# Patient Record
Sex: FEMALE | Race: WHITE | NOT HISPANIC OR LATINO | Employment: FULL TIME | URBAN - METROPOLITAN AREA
[De-identification: names, ages, dates, MRNs, and addresses within clinical notes are randomized per-mention and may not be internally consistent; named-entity substitution may affect disease eponyms.]

---

## 2023-07-07 ENCOUNTER — APPOINTMENT (OUTPATIENT)
Dept: RADIOLOGY | Facility: MEDICAL CENTER | Age: 33
End: 2023-07-07
Attending: SURGERY
Payer: COMMERCIAL

## 2023-07-07 ENCOUNTER — HOSPITAL ENCOUNTER (EMERGENCY)
Facility: MEDICAL CENTER | Age: 33
End: 2023-07-08
Attending: STUDENT IN AN ORGANIZED HEALTH CARE EDUCATION/TRAINING PROGRAM
Payer: COMMERCIAL

## 2023-07-07 ENCOUNTER — APPOINTMENT (OUTPATIENT)
Dept: RADIOLOGY | Facility: MEDICAL CENTER | Age: 33
End: 2023-07-07
Attending: STUDENT IN AN ORGANIZED HEALTH CARE EDUCATION/TRAINING PROGRAM
Payer: COMMERCIAL

## 2023-07-07 DIAGNOSIS — S02.401A CLOSED FRACTURE OF MAXILLA, UNSPECIFIED LATERALITY, INITIAL ENCOUNTER (HCC): ICD-10-CM

## 2023-07-07 DIAGNOSIS — S09.90XA CLOSED HEAD INJURY, INITIAL ENCOUNTER: ICD-10-CM

## 2023-07-07 DIAGNOSIS — S01.511A LIP LACERATION, INITIAL ENCOUNTER: ICD-10-CM

## 2023-07-07 DIAGNOSIS — S02.5XXA CLOSED FRACTURE OF TOOTH, INITIAL ENCOUNTER: ICD-10-CM

## 2023-07-07 LAB
ABO + RH BLD: NORMAL
ABO GROUP BLD: NORMAL
ALBUMIN SERPL BCP-MCNC: 4.3 G/DL (ref 3.2–4.9)
ALBUMIN/GLOB SERPL: 1.9 G/DL
ALP SERPL-CCNC: 34 U/L (ref 30–99)
ALT SERPL-CCNC: 9 U/L (ref 2–50)
ANION GAP SERPL CALC-SCNC: 13 MMOL/L (ref 7–16)
APTT PPP: 24.1 SEC (ref 24.7–36)
AST SERPL-CCNC: 14 U/L (ref 12–45)
BILIRUB SERPL-MCNC: 0.3 MG/DL (ref 0.1–1.5)
BLD GP AB SCN SERPL QL: NORMAL
BUN SERPL-MCNC: 10 MG/DL (ref 8–22)
CALCIUM ALBUM COR SERPL-MCNC: 8.5 MG/DL (ref 8.5–10.5)
CALCIUM SERPL-MCNC: 8.7 MG/DL (ref 8.5–10.5)
CHLORIDE SERPL-SCNC: 102 MMOL/L (ref 96–112)
CO2 SERPL-SCNC: 19 MMOL/L (ref 20–33)
CREAT SERPL-MCNC: 0.71 MG/DL (ref 0.5–1.4)
ERYTHROCYTE [DISTWIDTH] IN BLOOD BY AUTOMATED COUNT: 38.9 FL (ref 35.9–50)
ETHANOL BLD-MCNC: 65 MG/DL
GFR SERPLBLD CREATININE-BSD FMLA CKD-EPI: 115 ML/MIN/1.73 M 2
GLOBULIN SER CALC-MCNC: 2.3 G/DL (ref 1.9–3.5)
GLUCOSE SERPL-MCNC: 134 MG/DL (ref 65–99)
HCG SERPL QL: NEGATIVE
HCT VFR BLD AUTO: 36.9 % (ref 37–47)
HGB BLD-MCNC: 12.5 G/DL (ref 12–16)
INR PPP: 1.14 (ref 0.87–1.13)
MCH RBC QN AUTO: 30.9 PG (ref 27–33)
MCHC RBC AUTO-ENTMCNC: 33.9 G/DL (ref 32.2–35.5)
MCV RBC AUTO: 91.3 FL (ref 81.4–97.8)
PLATELET # BLD AUTO: 289 K/UL (ref 164–446)
PMV BLD AUTO: 9.9 FL (ref 9–12.9)
POTASSIUM SERPL-SCNC: 3.5 MMOL/L (ref 3.6–5.5)
PROT SERPL-MCNC: 6.6 G/DL (ref 6–8.2)
PROTHROMBIN TIME: 14.4 SEC (ref 12–14.6)
RBC # BLD AUTO: 4.04 M/UL (ref 4.2–5.4)
RH BLD: NORMAL
SODIUM SERPL-SCNC: 134 MMOL/L (ref 135–145)
WBC # BLD AUTO: 9.3 K/UL (ref 4.8–10.8)

## 2023-07-07 PROCEDURE — 700101 HCHG RX REV CODE 250: Performed by: STUDENT IN AN ORGANIZED HEALTH CARE EDUCATION/TRAINING PROGRAM

## 2023-07-07 PROCEDURE — 72125 CT NECK SPINE W/O DYE: CPT

## 2023-07-07 PROCEDURE — 86900 BLOOD TYPING SEROLOGIC ABO: CPT

## 2023-07-07 PROCEDURE — 306637 HCHG MISC ORTHO ITEM RC 0274

## 2023-07-07 PROCEDURE — 304999 HCHG REPAIR-SIMPLE/INTERMED LEVEL 1

## 2023-07-07 PROCEDURE — 72131 CT LUMBAR SPINE W/O DYE: CPT

## 2023-07-07 PROCEDURE — 71045 X-RAY EXAM CHEST 1 VIEW: CPT

## 2023-07-07 PROCEDURE — 84703 CHORIONIC GONADOTROPIN ASSAY: CPT

## 2023-07-07 PROCEDURE — 70450 CT HEAD/BRAIN W/O DYE: CPT

## 2023-07-07 PROCEDURE — 36415 COLL VENOUS BLD VENIPUNCTURE: CPT

## 2023-07-07 PROCEDURE — 71260 CT THORAX DX C+: CPT

## 2023-07-07 PROCEDURE — 85027 COMPLETE CBC AUTOMATED: CPT

## 2023-07-07 PROCEDURE — 85610 PROTHROMBIN TIME: CPT

## 2023-07-07 PROCEDURE — 82077 ASSAY SPEC XCP UR&BREATH IA: CPT

## 2023-07-07 PROCEDURE — 700117 HCHG RX CONTRAST REV CODE 255: Performed by: STUDENT IN AN ORGANIZED HEALTH CARE EDUCATION/TRAINING PROGRAM

## 2023-07-07 PROCEDURE — 85730 THROMBOPLASTIN TIME PARTIAL: CPT

## 2023-07-07 PROCEDURE — 86901 BLOOD TYPING SEROLOGIC RH(D): CPT

## 2023-07-07 PROCEDURE — 70486 CT MAXILLOFACIAL W/O DYE: CPT

## 2023-07-07 PROCEDURE — 99285 EMERGENCY DEPT VISIT HI MDM: CPT

## 2023-07-07 PROCEDURE — 305949 HCHG RED TRAUMA ACT PRE-NOTIFY NO CC

## 2023-07-07 PROCEDURE — 80053 COMPREHEN METABOLIC PANEL: CPT

## 2023-07-07 PROCEDURE — 86850 RBC ANTIBODY SCREEN: CPT

## 2023-07-07 PROCEDURE — 303353 HCHG DERMABOND SKIN ADHESIVE

## 2023-07-07 PROCEDURE — 303747 HCHG EXTRA SUTURE

## 2023-07-07 PROCEDURE — 72128 CT CHEST SPINE W/O DYE: CPT

## 2023-07-07 PROCEDURE — 72170 X-RAY EXAM OF PELVIS: CPT

## 2023-07-07 RX ORDER — ACETAMINOPHEN 500 MG
500-1000 TABLET ORAL EVERY 6 HOURS PRN
Qty: 30 TABLET | Refills: 0 | Status: SHIPPED | OUTPATIENT
Start: 2023-07-07

## 2023-07-07 RX ORDER — IBUPROFEN 600 MG/1
600 TABLET ORAL EVERY 6 HOURS PRN
Qty: 30 TABLET | Refills: 0 | Status: SHIPPED | OUTPATIENT
Start: 2023-07-07

## 2023-07-07 RX ORDER — CLINDAMYCIN HYDROCHLORIDE 300 MG/1
300 CAPSULE ORAL 3 TIMES DAILY
Qty: 21 CAPSULE | Refills: 0 | Status: ACTIVE | OUTPATIENT
Start: 2023-07-07

## 2023-07-07 RX ADMIN — LIDOCAINE HYDROCHLORIDE 20 ML: 10; .005 INJECTION, SOLUTION EPIDURAL; INFILTRATION; INTRACAUDAL; PERINEURAL at 23:03

## 2023-07-07 RX ADMIN — IOHEXOL 100 ML: 350 INJECTION, SOLUTION INTRAVENOUS at 22:30

## 2023-07-07 ASSESSMENT — PAIN DESCRIPTION - PAIN TYPE: TYPE: ACUTE PAIN

## 2023-07-08 VITALS
WEIGHT: 120 LBS | BODY MASS INDEX: 20.49 KG/M2 | RESPIRATION RATE: 18 BRPM | OXYGEN SATURATION: 99 % | SYSTOLIC BLOOD PRESSURE: 100 MMHG | HEIGHT: 64 IN | TEMPERATURE: 98.4 F | DIASTOLIC BLOOD PRESSURE: 58 MMHG | HEART RATE: 96 BPM

## 2023-07-08 PROCEDURE — 99284 EMERGENCY DEPT VISIT MOD MDM: CPT | Performed by: SURGERY

## 2023-07-08 NOTE — ED NOTES
Bedside report received from Trauma RN. Assumed patient care. Verified patient identification. Checked on bed, connected to monitor,  with unlabored respirations. Discussed plan of care. Vital signs is stable. Denied any new complaints. Gurney in low position, side rail up for pt safety. Call light within reach. Will continue to monitor for any complications.       Alert and Oriented x 4  On neck collar  Wound at upper lips
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
## 2023-07-08 NOTE — ED NOTES
ERP at bedside - wound suturing   Test Date:    2021-06-29               Test Time:    21:58:15

Technician:   PRO                                   

                                                     

MEASUREMENT RESULTS:                                       

Intervals:                                           

Rate:         73                                     

IA:           146                                    

QRSD:         82                                     

QT:           390                                    

QTc:          429                                    

Axis:                                                

P:            63                                     

IA:           146                                    

QRS:          51                                     

T:            38                                     

                                                     

INTERPRETIVE STATEMENTS:                                       

                                                     

Normal sinus rhythm

Normal ECG

Compared to ECG 12/26/2019 18:05:30

No significant changes



Electronically Signed On 06-30-21 16:33:02 CDT by Damion Man

## 2023-07-08 NOTE — DISCHARGE INSTRUCTIONS
Return in 5 to 7 days to have the sutures removed monitor for any signs of infection including increasing pain swelling pus draining redness or other concerns take the antibiotics until they are gone follow-up with a dentist as well as a facial surgeon.  Soft diet due to your dental fracture and dental concussions.
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
## 2023-07-22 NOTE — DOCUMENTATION QUERY
[facility]                                                                       Query Response Note      PATIENT:               ALVINO ROGERS  ACCT #:                  [id number]  MRN:                     [id number]  :                      1990  ADMIT DATE:       2023 9:14 PM  DISCH DATE:        2023 12:31 AM  RESPONDING  PROVIDER #:        [id number]           QUERY TEXT:    A full-thickness upper lip laceration/wound repair procedure was documented in the Medical Record.    Can you please describe the extent of the laceration?    NOTE- If an appropriate response is not listed below, please respond with a new note.    Any questions please contact me via email [email or web address]    The patient's clinical indicators include:  Final Diagnosis: Lip laceration, initial encounter.  Physical Exam / HEENT: HEENT - full-thickness laceration of upper lip.    Treatment or Monitoring: PROCEDURE - Laceration repair - The skin was prepped and draped.  mL of 1% lidocaine with/without epinephrine was used in local infiltration with good anesthetic result. The laceration extending through the vermilion border of her superior lip. 1 deep subcuticular suture the lip laceration was complex and closed in a layered fashion.  Deep tissue was approximated using 2 simple interrupted 5-0 absorbable sutures, the vermilion border aligned and well approximated the mucosal laceration was repaired using 2 separate 5-0 absorbable sutures.      Risk Factors: N/A  Options provided:   -- Full Thickness Upper Lip Laceration (does not cross vermilion border)   -- Up to Half Vertical Height, Upper Lip Laceration   -- Over One-Half Vertical Height or Complex, Upper Lip Laceration   -- Other / Unspecified Upper Lip Laceration   -- Other (please specify in the medical record)   -- Unable to determine      Query created by: Karina Masterson on 2023 6:44  AM    RESPONSE TEXT:    Over One-Half Vertical Height or Complex, Upper Lip Laceration          Electronically signed by:  MILA XAVIER MD 7/22/2023 1:07 AM

## 2024-11-25 ENCOUNTER — OFFICE VISIT (OUTPATIENT)
Dept: URGENT CARE | Facility: PHYSICIAN GROUP | Age: 34
End: 2024-11-25
Payer: COMMERCIAL

## 2024-11-25 ENCOUNTER — APPOINTMENT (OUTPATIENT)
Dept: URGENT CARE | Facility: CLINIC | Age: 34
End: 2024-11-25
Payer: COMMERCIAL

## 2024-11-25 ENCOUNTER — HOSPITAL ENCOUNTER (OUTPATIENT)
Facility: MEDICAL CENTER | Age: 34
End: 2024-11-25
Attending: PHYSICIAN ASSISTANT
Payer: COMMERCIAL

## 2024-11-25 VITALS
RESPIRATION RATE: 18 BRPM | OXYGEN SATURATION: 98 % | HEART RATE: 90 BPM | BODY MASS INDEX: 21.16 KG/M2 | WEIGHT: 127 LBS | DIASTOLIC BLOOD PRESSURE: 76 MMHG | HEIGHT: 65 IN | SYSTOLIC BLOOD PRESSURE: 118 MMHG | TEMPERATURE: 99.6 F

## 2024-11-25 DIAGNOSIS — R10.30 LOWER ABDOMINAL PAIN: ICD-10-CM

## 2024-11-25 DIAGNOSIS — Z20.2 POSSIBLE EXPOSURE TO STD: ICD-10-CM

## 2024-11-25 LAB
APPEARANCE UR: NORMAL
BILIRUB UR STRIP-MCNC: NEGATIVE MG/DL
COLOR UR AUTO: NORMAL
GLUCOSE UR STRIP.AUTO-MCNC: NEGATIVE MG/DL
KETONES UR STRIP.AUTO-MCNC: NORMAL MG/DL
LEUKOCYTE ESTERASE UR QL STRIP.AUTO: NEGATIVE
NITRITE UR QL STRIP.AUTO: NEGATIVE
PH UR STRIP.AUTO: 5.5 [PH] (ref 5–8)
POCT INT CON NEG: NEGATIVE
POCT INT CON POS: POSITIVE
POCT URINE PREGNANCY TEST: NEGATIVE
PROT UR QL STRIP: NORMAL MG/DL
RBC UR QL AUTO: NORMAL
SP GR UR STRIP.AUTO: 1.01
UROBILINOGEN UR STRIP-MCNC: NORMAL MG/DL

## 2024-11-25 PROCEDURE — 3074F SYST BP LT 130 MM HG: CPT | Performed by: PHYSICIAN ASSISTANT

## 2024-11-25 PROCEDURE — 87660 TRICHOMONAS VAGIN DIR PROBE: CPT

## 2024-11-25 PROCEDURE — 87591 N.GONORRHOEAE DNA AMP PROB: CPT

## 2024-11-25 PROCEDURE — 81002 URINALYSIS NONAUTO W/O SCOPE: CPT | Performed by: PHYSICIAN ASSISTANT

## 2024-11-25 PROCEDURE — 99203 OFFICE O/P NEW LOW 30 MIN: CPT | Performed by: PHYSICIAN ASSISTANT

## 2024-11-25 PROCEDURE — 87491 CHLMYD TRACH DNA AMP PROBE: CPT

## 2024-11-25 PROCEDURE — 87510 GARDNER VAG DNA DIR PROBE: CPT

## 2024-11-25 PROCEDURE — 87480 CANDIDA DNA DIR PROBE: CPT

## 2024-11-25 PROCEDURE — 81025 URINE PREGNANCY TEST: CPT | Performed by: PHYSICIAN ASSISTANT

## 2024-11-25 PROCEDURE — 3078F DIAST BP <80 MM HG: CPT | Performed by: PHYSICIAN ASSISTANT

## 2024-11-25 ASSESSMENT — ENCOUNTER SYMPTOMS
VOMITING: 0
SHORTNESS OF BREATH: 0
BLOOD IN STOOL: 0
DIARRHEA: 0
FEVER: 1
FLANK PAIN: 0
PALPITATIONS: 0
NAUSEA: 0
CONSTIPATION: 0
ABDOMINAL PAIN: 1

## 2024-11-25 ASSESSMENT — FIBROSIS 4 INDEX: FIB4 SCORE: .5490196078431372549

## 2024-11-25 NOTE — LETTER
November 25, 2024         Patient: Melany Uribe   YOB: 1990   Date of Visit: 11/25/2024           To Whom it May Concern:    Melany Uribe was seen in my clinic on 11/25/2024.  Please excuse her absence today.    If you have any questions or concerns, please don't hesitate to call.        Sincerely,           Timothy Espitia P.A.-C.  Electronically Signed

## 2024-11-26 DIAGNOSIS — Z20.2 POSSIBLE EXPOSURE TO STD: ICD-10-CM

## 2024-11-26 LAB
AMBIGUOUS DTTM AMBI4: NORMAL
CANDIDA DNA VAG QL PROBE+SIG AMP: POSITIVE
G VAGINALIS DNA VAG QL PROBE+SIG AMP: NEGATIVE
T VAGINALIS DNA VAG QL PROBE+SIG AMP: NEGATIVE

## 2024-11-26 NOTE — PROGRESS NOTES
"  Subjective:   Melany Uribe is a 34 y.o. female who presents today with   Chief Complaint   Patient presents with    RLQ Pain     Fever      RLQ Pain  This is a new problem. The current episode started today. The onset quality is gradual. The problem occurs constantly. The problem has been unchanged. Associated symptoms include a fever. Pertinent negatives include no constipation, diarrhea, dysuria, frequency, hematuria, nausea or vomiting. She has tried nothing for the symptoms. The treatment provided no relief.     Patient has temperature up to 102 at home.  Patient states that she had some lower abdominal achy feeling on Friday and it seemed to improve over the weekend.  Has been having bowel movements with last 1 reported on Saturday she states.  Does drink 3-4 beers per night.  States no drug use.    PMH:  has no past medical history on file.  MEDS: No current outpatient medications on file.  ALLERGIES:   Allergies   Allergen Reactions    Amoxicillin      unknown     SURGHX: History reviewed. No pertinent surgical history.  SOCHX:  reports that she has never smoked. She has never used smokeless tobacco. She reports current alcohol use. She reports that she does not use drugs.  FH: Reviewed with patient, not pertinent to this visit.     Review of Systems   Constitutional:  Positive for fever.   Respiratory:  Negative for shortness of breath.    Cardiovascular:  Negative for chest pain and palpitations.   Gastrointestinal:  Positive for abdominal pain. Negative for blood in stool, constipation, diarrhea, nausea and vomiting.   Genitourinary:  Negative for dysuria, flank pain, frequency, hematuria and urgency.      Objective:   /76   Pulse 90   Temp 37.6 °C (99.6 °F)   Resp 18   Ht 1.638 m (5' 4.5\")   Wt 57.6 kg (127 lb)   SpO2 98%   BMI 21.46 kg/m²   Physical Exam  Vitals and nursing note reviewed.   Constitutional:       General: She is not in acute distress.     Appearance: Normal " appearance. She is well-developed. She is not ill-appearing or toxic-appearing.   HENT:      Head: Normocephalic and atraumatic.      Right Ear: Hearing normal.      Left Ear: Hearing normal.   Cardiovascular:      Rate and Rhythm: Normal rate and regular rhythm.      Heart sounds: Normal heart sounds.   Pulmonary:      Effort: Pulmonary effort is normal.      Breath sounds: Normal breath sounds.   Abdominal:      General: Bowel sounds are normal. There is no distension.      Palpations: Abdomen is soft.      Tenderness: There is no abdominal tenderness. There is no right CVA tenderness, left CVA tenderness or guarding. Negative signs include Medellin's sign and McBurney's sign.      Hernia: No hernia is present.   Genitourinary:     Comments: Patient deferred  exam  Musculoskeletal:      Comments: Normal movement in all 4 extremities   Skin:     General: Skin is warm and dry.   Neurological:      Mental Status: She is alert.      Coordination: Coordination normal.   Psychiatric:         Mood and Affect: Mood normal.       UA no signs of infection  HCG negative    Assessment/Plan:   Assessment    1. Lower abdominal pain  - POCT Urinalysis  - POCT Pregnancy    2. Possible exposure to STD  - VAGINAL PATHOGENS DNA PANEL; Future  - Chlamydia/GC, PCR (Genital/Anal swab); Future    No acute point tenderness on abdominal exam.  No indication for higher level of imaging or evaluation at this time.  No findings of PID on exam today.  Patient does note new recent sexual partner and would like to have STD testing but declines blood testing at this time.  No signs of ovarian torsion and no abdominal tenderness on my exam.  Discussed with patient to have close monitoring and low threshold and with any new or worsening symptoms would recommend going into the ER for higher level evaluation.  No indication for CT or ultrasound at this time.  No signs of bowel obstruction as patient has still been having bowel movements.  Patient  understands have low threshold to go to the ER with any new sharp pain to the right lower quadrant and we discussed findings that would be suggestive of appendicitis which she does not have any at this time.  No right flank pain and no findings that would be suggestive of kidney etiology at this time either but she is aware of symptoms to monitor for.    Differential diagnosis, natural history, supportive care, and indications for immediate follow-up discussed.   Patient given instructions and understanding of medications and treatment.    If not improving in 3-5 days, F/U with PCP or return to UC if symptoms worsen.  Strict ER precautions given.  Patient agreeable to plan.    Please note that this dictation was created using voice recognition software. I have made every reasonable attempt to correct obvious errors, but I expect that there are errors of grammar and possibly content that I did not discover before finalizing the note.    Timothy Espitia PA-C

## 2024-11-27 ENCOUNTER — OFFICE VISIT (OUTPATIENT)
Dept: URGENT CARE | Facility: CLINIC | Age: 34
End: 2024-11-27
Payer: COMMERCIAL

## 2024-11-27 VITALS
HEART RATE: 115 BPM | BODY MASS INDEX: 21.51 KG/M2 | HEIGHT: 64 IN | RESPIRATION RATE: 19 BRPM | WEIGHT: 126 LBS | OXYGEN SATURATION: 97 % | DIASTOLIC BLOOD PRESSURE: 72 MMHG | TEMPERATURE: 98.5 F | SYSTOLIC BLOOD PRESSURE: 98 MMHG

## 2024-11-27 DIAGNOSIS — B37.31 VAGINAL YEAST INFECTION: ICD-10-CM

## 2024-11-27 DIAGNOSIS — Z87.898 HISTORY OF FEVER: ICD-10-CM

## 2024-11-27 DIAGNOSIS — K13.0 ABNORMAL COLOR OF LIPS: ICD-10-CM

## 2024-11-27 LAB
C TRACH DNA GENITAL QL NAA+PROBE: NEGATIVE
N GONORRHOEA DNA GENITAL QL NAA+PROBE: NEGATIVE
SPECIMEN SOURCE: NORMAL

## 2024-11-27 RX ORDER — FLUCONAZOLE 150 MG/1
150 TABLET ORAL ONCE
Qty: 2 TABLET | Refills: 0 | Status: SHIPPED | OUTPATIENT
Start: 2024-11-27 | End: 2024-11-27

## 2024-11-27 ASSESSMENT — FIBROSIS 4 INDEX: FIB4 SCORE: .5490196078431372549

## 2024-11-28 NOTE — PROGRESS NOTES
Subjective:     Verbal consent was acquired by the patient to use [employer] ambient listening note generation during this visit     Melany Uribe is a 34 y.o. female who presents for Fever (X 3 days /Blue lip today)       History of Present Illness  The patient presents for evaluation of fever, abdominal pain, headache, and blue lips.    This a pleasant 34-year-old female who was seen 2 days ago with abdominal pain.  She presents today for evaluation of fevers and low temperatures.  She experienced a fever spike on Sunday afternoon, which she monitored and recorded a peak temperature of 102 degrees. The fever persisted for approximately 24 hours before subsiding. She sought medical attention at an urgent care facility on Monday night, where a urine test was conducted, yielding no significant findings other than vaginal candidiasis. Her fever began to decrease naturally on Monday night. She initiated her menstrual cycle and typically experiences cramps, for which she took a single dose of 200 mg ibuprofen from late Monday evening until this morning. She continued to monitor her temperature during this period. She reported a transient episode of lip discoloration around 4:00 PM today, accompanied by a low temperature reading of 95.3 degrees for approximately 30 minutes. Her temperature has since returned to normal. She reports no lip pain. She has been eating very minimal due to decreased appetite. She reports no nausea or vomiting. She has not had any recent blood work done. She reports no cough, shortness of breath, chest pain, dizziness, or lightheadedness. She reports no oral lesions, rashes, or sores. She reports normal urination and adequate hydration. She did not attend work yesterday due to this symptom. She does not have a primary care physician.    Her abdominal pain is resolved    She reported experiencing a throbbing headache on Monday night or Tuesday morning, which she managed with ibuprofen.  "She does not typically experience headaches with her menstrual cramps and has no history of migraines. She reports no new speech changes.              Medications:  fluconazole    Allergies:             Amoxicillin    Past Social Hx:  Melany Uribe  reports that she has never smoked. She has never used smokeless tobacco. She reports current alcohol use. She reports current drug use. Drug: Cocaine.           Problem list, medications, and allergies reviewed by myself today in Epic.     Objective:     BP 98/72   Pulse (!) 115   Temp 36.9 °C (98.5 °F) (Temporal)   Resp 19   Ht 1.626 m (5' 4\")   Wt 57.2 kg (126 lb)   SpO2 97%   BMI 21.63 kg/m²     Physical Exam  Vitals and nursing note reviewed.   Constitutional:       General: She is not in acute distress.     Appearance: Normal appearance. She is not ill-appearing, toxic-appearing or diaphoretic.      Comments: This is a nontoxic-appearing patient in no apparent distress   HENT:      Head: Normocephalic.      Right Ear: Tympanic membrane normal.      Left Ear: Tympanic membrane normal.      Nose: Nose normal.      Mouth/Throat:      Mouth: Mucous membranes are moist.      Pharynx: Oropharynx is clear. No oropharyngeal exudate or posterior oropharyngeal erythema.   Eyes:      General: No visual field deficit.     Extraocular Movements: Extraocular movements intact.      Conjunctiva/sclera: Conjunctivae normal.      Pupils: Pupils are equal, round, and reactive to light.   Cardiovascular:      Rate and Rhythm: Normal rate and regular rhythm.      Pulses: Normal pulses.   Pulmonary:      Effort: Pulmonary effort is normal. No respiratory distress.      Breath sounds: No stridor. No wheezing or rales.   Musculoskeletal:         General: Normal range of motion.      Cervical back: Normal range of motion and neck supple. No rigidity or tenderness.   Lymphadenopathy:      Cervical: No cervical adenopathy.   Skin:     General: Skin is warm.      Coloration: Skin " is not jaundiced or pale.      Findings: No bruising, erythema, lesion or rash.   Neurological:      General: No focal deficit present.      Mental Status: She is alert and oriented to person, place, and time. Mental status is at baseline.      GCS: GCS eye subscore is 4. GCS verbal subscore is 5. GCS motor subscore is 6.      Cranial Nerves: No cranial nerve deficit, dysarthria or facial asymmetry.      Motor: Motor function is intact. No tremor, abnormal muscle tone or pronator drift.      Coordination: Coordination is intact. Romberg sign negative. Coordination normal. Finger-Nose-Finger Test normal.      Gait: Gait normal.      Deep Tendon Reflexes: Reflexes are normal and symmetric. Reflexes normal. Babinski sign absent on the right side.   Psychiatric:         Attention and Perception: Attention and perception normal.         Mood and Affect: Mood and affect normal.         Speech: Speech normal.         Behavior: Behavior normal.         Thought Content: Thought content normal.         Cognition and Memory: Cognition normal.         Judgment: Judgment normal.                 Assessment/Plan:     Diagnosis and Associated Orders:     1. History of fever  - Referral to establish with PCP  - CBC WITH DIFFERENTIAL; Future  - Comp Metabolic Panel; Future    2. Abnormal color of lips        Medical Decision Making:    Pleasant 34 y.o. presents to clinic with:    Assessment & Plan  1.  Subjective fever  Her vital signs are within normal limits, except for a slightly elevated heart rate, which could be attributed to recent physical activity, potential dehydration, or the fever itself however she is afebrile here today.. Her oxygen saturation is normal, and she is not experiencing any respiratory symptoms such as cough, rhinorrhea, congestion, or persistent fevers. Given these observations, it is unlikely that she has contracted COVID-19 or influenza. She may be suffering from a viral illness.  I did review her last  set of labs from 2023, her last hemoglobin level was 12.5, which is within the normal range for women (12-16). However, it is important to note that a hemoglobin level of 12.5 is at the lower end of this range. A referral to a primary care physician will be made. Additionally, orders for lab work will be placed to rule out anemia and to check her white blood cell count. She is advised to complete these lab tests prior to her appointment with the primary care physician.    2. Abdominal pain.  She reported abdominal pain that started on Friday night, subsided on Saturday, and returned on Sunday afternoon with the fever. The pain has since resolved.  She is advised to monitor for any recurrence of pain and to seek medical attention if it returns or worsens.    3. Headache.  She experienced a throbbing headache that started on Monday night or Tuesday morning, which she managed with ibuprofen. The headache has since resolved. It is noted that headaches can be part of the viral process. She is advised to monitor for any recurrence of headaches and to seek medical attention if they return or worsen.  Neuroexam is nonfocal without any red flags.    4. Blue lips.  She reported noticing blue lips, which could be due to inaccurate readings from her home thermometer. No current signs of cyanosis were observed during the examination. She is advised to monitor for any recurrence and to seek medical attention if it happens again.  I do not appreciate any lip discoloration however will check lab work to rule out anemia as previously discussed    I personally reviewed prior external notes and test results pertinent to today's visit. Patient is clinically stable at today's urgent care visit.  Patient appears nontoxic with no acute distress noted. Appropriate for outpatient care at this time.  Return to clinic or go to ED if symptoms worsen or persist.  Red flag symptoms discussed.  Patient/Parent/Guardian voices understanding.   All  side effects of medication discussed including allergic response, GI upset, tendon injury, rash, sedation etc    Please note that this dictation was created using voice recognition software. I have made a reasonable attempt to correct obvious errors, but I expect that there are errors of grammar and possibly content that I did not discover before finalizing the note.    This note was electronically signed by Sommer Vicente PA-C

## 2025-01-15 ENCOUNTER — TELEPHONE (OUTPATIENT)
Dept: HEALTH INFORMATION MANAGEMENT | Facility: OTHER | Age: 35
End: 2025-01-15
Payer: COMMERCIAL